# Patient Record
(demographics unavailable — no encounter records)

---

## 2024-10-11 NOTE — PHYSICAL EXAM
[de-identified] : left 5th digit, healed scar.  small lump over area with tenderness to palpitation

## 2024-10-11 NOTE — HISTORY OF PRESENT ILLNESS
[FreeTextEntry8] : Acute care visit  Patient reports that in August she was dining a the [employer] in .  Upon picking up a glass drink at her table, the glass broke in her hand and the sharp glass pieces penetrated most of the fingers of her left hand.  She immediately was shocked and in pain and quickly moved as many pieces of glass from her hand that she could.  She suffered small lacerations to most of her fingers.  They have all superficially healed by now.  However, her left 5th digit still causes pain and tenderness.  She is also unable to fully close this finger.

## 2025-05-14 NOTE — HEALTH RISK ASSESSMENT
[Yes] : Yes [2 - 4 times a month (2 pts)] : 2-4 times a month (2 points) [1 or 2 (0 pts)] : 1 or 2 (0 points) [Never (0 pts)] : Never (0 points) [No] : In the past 12 months have you used drugs other than those required for medical reasons? No [0] : 2) Feeling down, depressed, or hopeless: Not at all (0) [PHQ-2 Negative - No further assessment needed] : PHQ-2 Negative - No further assessment needed [Never] : Never [Patient reported mammogram was normal] : Patient reported mammogram was normal [Patient reported PAP Smear was normal] : Patient reported PAP Smear was normal [Patient reported bone density results were abnormal] : Patient reported bone density results were abnormal [Excellent] : ~his/her~  mood as  excellent [No falls in past year] : Patient reported no falls in the past year [Employed] : employed [Fully functional (bathing, dressing, toileting, transferring, walking, feeding)] : Fully functional (bathing, dressing, toileting, transferring, walking, feeding) [Fully functional (using the telephone, shopping, preparing meals, housekeeping, doing laundry, using] : Fully functional and needs no help or supervision to perform IADLs (using the telephone, shopping, preparing meals, housekeeping, doing laundry, using transportation, managing medications and managing finances) [Audit-CScore] : 2 [de-identified] : exercises routinely, walks, lifts weight [de-identified] : improving [[id number]] : 0 [EyeExamDate] : n/a [Patient reported colonoscopy was normal] : Patient reported colonoscopy was normal [# Of Children ___] : has [unfilled] children [Reports changes in hearing] : Reports no changes in hearing [Reports changes in vision] : Reports no changes in vision [Reports changes in dental health] : Reports no changes in dental health [MammogramDate] : 05/2024 [PapSmearDate] : 09/2024 [BoneDensityDate] : 05/2024 [BoneDensityComments] : Osteopenia  [ColonoscopyDate] : 05/23 [ColonoscopyComments] : cologuard

## 2025-05-14 NOTE — HISTORY OF PRESENT ILLNESS
[FreeTextEntry1] : CPE [de-identified] : 61 year F presents for annual physical exam. Feeling well with no complaints.